# Patient Record
Sex: MALE | Race: WHITE
[De-identification: names, ages, dates, MRNs, and addresses within clinical notes are randomized per-mention and may not be internally consistent; named-entity substitution may affect disease eponyms.]

---

## 2019-10-02 ENCOUNTER — HOSPITAL ENCOUNTER (OUTPATIENT)
Dept: HOSPITAL 74 - JASU-SURG | Age: 49
Discharge: HOME | End: 2019-10-02
Attending: ORTHOPAEDIC SURGERY
Payer: COMMERCIAL

## 2019-10-02 VITALS — TEMPERATURE: 97.4 F

## 2019-10-02 VITALS — HEART RATE: 66 BPM | DIASTOLIC BLOOD PRESSURE: 70 MMHG | SYSTOLIC BLOOD PRESSURE: 120 MMHG

## 2019-10-02 VITALS — BODY MASS INDEX: 34.9 KG/M2

## 2019-10-02 DIAGNOSIS — M25.862: ICD-10-CM

## 2019-10-02 DIAGNOSIS — M23.92: Primary | ICD-10-CM

## 2019-10-02 PROCEDURE — 29877 ARTHRS KNEE SURG DBRDMT/SHVG: CPT

## 2019-10-02 PROCEDURE — 0SBD4ZZ EXCISION OF LEFT KNEE JOINT, PERCUTANEOUS ENDOSCOPIC APPROACH: ICD-10-PCS | Performed by: ORTHOPAEDIC SURGERY

## 2019-10-02 NOTE — OP
Operative Note





- Note:


Operative Date: 10/02/19 ([facility])


Pre-Operative Diagnosis: left knee internal derangement


Operation: left knee arthroscopy with debridement chondroplasty patella


Post-Operative Diagnosis: Same as Pre-op


Surgeon: Yuan Burgos


Anesthesia: General, Local


Specimens Removed: shavings


Estimated Blood Loss (mls): 5


Operative Report Dictated: Yes

## 2019-10-02 NOTE — OP
DATE OF OPERATION:  10/02/2019

 

PREOPERATIVE DIAGNOSIS:  Internal derangement, left knee.

 

POSTOPERATIVE DIAGNOSIS:  Internal derangement, left knee.

 

PROCEDURE:  Arthroscopy, left knee with chondroplasty of the patella.

 

SURGICAL ATTENDING:  Yuan Burgos MD

 

ANESTHESIA:  General with LMA.

 

CLOSURE:  4-0 nylon.

 

COMPLICATIONS:  None.

 

CONDITION:  Recovery in stable condition.

 

DESCRIPTION OF OPERATIVE PROCEDURE:  Patient taken to the operating room on October 2, 2019.  General anesthesia with LMA was administered by the anesthesiologist.  IV

Kefzol was administered prophylactically prior to the case.  Medial and lateral

infrapatellar portal sites were infiltrated with 1% Xylocaine with epinephrine.  The

2 portals were then made with a 15 blade followed by blunt trocar.  Scope was placed

up in the lateral infrapatellar portal and up into the suprapatellar pouch.  Using

the port on the trocar, the knee was inflated with 10 mL of 1% Xylocaine, 10 mL of

0.5% Marcaine, and 20 mL of arthroscopic saline.  After allowing the anesthetic to

work inside the knee, the procedure was performed.  The pouch was visualized to be

clean.  The medial and lateral gutters had just some loose fluid and debris which

were washed out of the knee both medially and laterally.  The undersurface of the

patella was visualized to the have a large, grade 4 area on its lateral facet.  The

rest of the patella showed grade 2-3 changes, and his articular cartilage was

debrided using the shaver.  The trochlea itself also had some grade 2-3 changes, but

no full-thickness cartilage loss; any loose articular cartilage debrided using the

shaver.  With valgus stress on the knee, medial compartment was entered.  Medial

meniscus visualized, probed, and found to be intact.  The medial femoral condyle was

found to basically be intact, as was the medial tibial plateau.  In the figure-4

position, the lateral compartment was entered.  The lateral meniscus visualized,

probed, found to be intact.  Lateral femoral condyle was found to be intact as well

as the lateral tibial plateau.  At 90 degrees, the ACL was visualized and probed,

found to be intact.  Knee was flushed out with a great amount of arthroscopic fluid

and drained.  The portals were closed.  Then, the knee was inflated with 20 mL of

0.5% Marcaine.  A sterile pressure dressing was placed on the knee.  The patient was

awakened from anesthesia and transferred to recovery in stable condition.  No

complication.  Estimated blood loss negligible.

 

 

RIDGE [id number]

DD: 10/02/2019 09:27

DT: 10/02/2019 09:59

Job #:  [id number]

## 2019-10-02 NOTE — HP
Satellite [facility]





- Chief Complaint


Chief Complaint: left knee pain





- Past Medical History


Allergies/Adverse Reactions: 


 Allergies











Allergy/AdvReac Type Severity Reaction Status Date / Time


 


No Known Allergies Allergy   Verified 10/02/19 07:55














- Current Medications


Current Medications: 


 Home Medications











 Medication  Instructions  Recorded


 


Acetaminophen [Tylenol 1,000 mg PO Q4H 09/30/19





.Extra-Strength -]  


 


Clonazepam [Klonopin] 1 mg PO DAILY 09/30/19


 


Cyclobenzaprine HCl [Flexeril -] 10 mg PO BID 09/30/19


 


Metformin HCl [Glucophage] 500 mg PO BID 09/30/19


 


Methadone [Dolophine -] 170 mg PO DAILY 09/30/19


 


Sertraline HCl [Zoloft] 150 mg PO HS 09/30/19


 


Naproxen [Naprosyn -] 500 mg PO BID #60 tablet 10/02/19














Satellite Physical Exam





- Physical Examination


Vital Signs: 


 Vital Signs











 Period  Temp  Pulse  Resp  BP Sys/Luna  Pulse Ox


 


 Last 24 Hr  98.6 F  72  18  130/63  98











General Appearance: Well Nourished, Well Developed, Alert & Oriented x3


ENT: Clear


Lung: Normal air movement


Heart: Regular rate & rhythm


Extremities: Other (left knee- + swelling, + ttp, decr rom, +mcmurrays, nvi, 

MRI + mmt, OA)


Neurological: Intact, Alert, Oriented





Satellite Impression/Plan





- Impression/Plan


Impression: left knee internal derangement


Operative Procedure: left knee arthroscopy


Date to be Performed: 10/02/19

## 2019-10-04 NOTE — PATH
Surgical Pathology Report



Patient Name:  NI MAGAÑA

Accession #:  [id number]

[facility]. Rec. #:  [id number]                                                        

   /Age/Gender:  1970 (Age: 49) / M

Account:  [id number]                                                          

             Location: [facility] SURGICAL

Taken:  10/2/2019

Received:  10/2/2019

Reported:  10/4/2019

Physicians:  Yuan Burgos M.D.

  



Specimen(s) Received

 LEFT KNEE SHAVINGS 





Clinical History

Left knee internal derangement







Final Diagnosis

LEFT KNEE SHAVINGS:

FRAGMENTS OF FIBROSYNOVIAL TISSUE AND CARTILAGINOUS TISSUE WITH FOCAL FIBROSIS

AND DEGENERATIVE CHANGE.





***Electronically Signed***

Dustin Bashir M.D.





Gross Description

Received in formalin, labeled "left knee shavings" are multiple white, irregular

portions of soft tissue measuring 4.0 x 4.0 x 0.2 cm in aggregate.

Representative portions are submitted in one cassette.

TASNEEM/10/2/2019



elmer/10/2/2019